# Patient Record
Sex: FEMALE | Race: OTHER
[De-identification: names, ages, dates, MRNs, and addresses within clinical notes are randomized per-mention and may not be internally consistent; named-entity substitution may affect disease eponyms.]

---

## 2017-04-07 ENCOUNTER — HOSPITAL ENCOUNTER (EMERGENCY)
Dept: HOSPITAL 74 - FER | Age: 62
Discharge: LEFT BEFORE DISCHARGE | End: 2017-04-07
Payer: COMMERCIAL

## 2017-04-07 VITALS — BODY MASS INDEX: 2.4 KG/M2

## 2017-04-07 VITALS — SYSTOLIC BLOOD PRESSURE: 134 MMHG | HEART RATE: 80 BPM | DIASTOLIC BLOOD PRESSURE: 80 MMHG | TEMPERATURE: 97.9 F

## 2017-04-07 DIAGNOSIS — E78.5: ICD-10-CM

## 2017-04-07 DIAGNOSIS — R07.9: Primary | ICD-10-CM

## 2017-04-07 DIAGNOSIS — J45.909: ICD-10-CM

## 2017-04-07 LAB
ALBUMIN SERPL-MCNC: 3.9 G/DL (ref 3.5–5)
ALP SERPL-CCNC: 64 U/L (ref 32–92)
ALT SERPL-CCNC: 20 U/L (ref 10–40)
ANION GAP SERPL CALC-SCNC: 5 MMOL/L (ref 8–16)
AST SERPL-CCNC: 20 U/L (ref 10–42)
BASOPHILS # BLD: 1 % (ref 0–2)
BILIRUB SERPL-MCNC: < 0.3 MG/DL (ref 0.2–1)
CALCIUM SERPL-MCNC: 9.1 MG/DL (ref 8.4–10.2)
CK SERPL-CCNC: 80 IU/L (ref 26–140)
CO2 SERPL-SCNC: 24 MMOL/L (ref 22–28)
COCKROFT - GAULT: 6.96
CREAT SERPL-MCNC: 0.8 MG/DL (ref 0.6–1.3)
DEPRECATED RDW RBC AUTO: 13.1 % (ref 11.6–15.6)
EOSINOPHIL # BLD: 4 % (ref 0–4.5)
GLUCOSE SERPL-MCNC: 91 MG/DL (ref 74–106)
INR BLD: 1 (ref 0.82–1.09)
MCH RBC QN AUTO: 28 PG (ref 25.7–33.7)
MCHC RBC AUTO-ENTMCNC: 33.7 G/DL (ref 32–36)
MCV RBC: 83 FL (ref 80–96)
NEUTROPHILS # BLD: 51 % (ref 42.8–82.8)
PMV BLD: 9.7 FL (ref 7.5–11.1)
PROT SERPL-MCNC: 7.1 G/DL (ref 6.4–8.3)
PT PNL PPP: 11.2 SEC (ref 10.2–13)
TROPONIN I SERPL-MCNC: < 0.03 NG/ML (ref 0.03–0.5)
WBC # BLD AUTO: 7.9 K/MM3 (ref 4–10)

## 2017-04-07 PROCEDURE — 3E033GC INTRODUCTION OF OTHER THERAPEUTIC SUBSTANCE INTO PERIPHERAL VEIN, PERCUTANEOUS APPROACH: ICD-10-PCS

## 2017-04-07 NOTE — PDOC
History of Present Illness





- General


Chief Complaint: Chest Pain


Stated Complaint: CHEST PRESSURE


Time Seen by Provider: 04/07/17 12:22


History Source: Patient


Exam Limitations: No Limitations





- History of Present Illness


Initial Comments: 


04/07/17 12:23


This pt is a 60 yo F with a past medical history of pre Diabetes, HLD, Asthma 

presenting to the ER with a complaint of chest pressure.  


The patient reports that the symptoms began yesterday.  Yeserday had chest 

pressure lasting 15 - 20 minutes.  


Today this recurred and is ongoing, occurred while doing nothing in particular. 


PT described chest symptoms as pressure, rates it 4/10, no radiation to arm/jaw 

or back


No associated diaphoresis or nausea


No exertional symptoms


Pt denies chest wall trauma or bruising.


Pt denies lower extremity edema.


Pt denies cough or shortness of breath


Pt denies nausea, vomiting, or diarrhea. 


Pt denies lightheadedness, palpitations, or syncope


Pt denies radiation of pain, aggravation of pain or alleviation of pain.


Pt denies allergies, and significant past hospitalizations.


Pt denies alcohol use, tobacco use, or recreational drug use. 


Pt denies past history of cardiac disease. 


Stress test ?10 years ago (Reportedly negative)


Pt denies recent travel. 








PMH: pre diabetes, HLD


PSH: denies


Meds: Singulair, Atorvastatin, Metformin


ALL: NKDA


Social: denies tobacco use








GENERAL/CONSTITUTIONAL: No: fever, chills, weakness, loss of appetite.


HEAD, EYES, EARS, NOSE AND THROAT: No: change in vision, ear pain, discharge, 

sore throat, throat swelling.


CARDIOVASCULAR: (+) chest pain No: lightheadedness, palpitations, syncope


RESPIRATORY: No: cough, shortness of breath, wheezing, hemoptysis, stridor.


GASTROINTESTINAL: No: nausea, vomiting, diarrhea, abdominal cramping, rectal 

bleeding, constipation. 


GENITOURINARY: No: dysuria, hematuria, frequency, urgency, flank pain.


MUSCULOSKELETAL: No: back pain, neck pain, joint pain, muscle swelling or pain


SKIN AND BREASTS: No: lesions, pallor, rash or easy bruising.


NEUROLOGIC: No: headache, vertigo, paresthesias, weakness 


ENDOCRINE: No: unexplained weight gain or loss


HEMATOLOGIC/LYMPHATIC: No: anemia, easy bleeding, swelling nodes.


 


GENERAL: The patient is in no acute distress.


HEAD: Normal with no signs of trauma.


EYES: PERRLA, EOMI, sclera anicteric, conjunctiva clear.


ENT: Ears normal, nares patent, oropharynx clear without exudates.  Moist 

mucous membranes.


NECK: Normal range of motion, supple without lymphadenopathy, JVD, or masses.


LUNGS: Breath sounds equal, clear to auscultation bilaterally.  No wheezes, and 

no crackles.


HEART: Regular rate and rhythm, normal S1 and S2 without murmur, rub or gallop.


ABDOMEN: Soft, nontender, normoactive bowel sounds.  No guarding, no rebound.  

No masses palpable.


EXTREMITIES: Normal range of motion, no edema.  No clubbing or cyanosis. No 

erythema, or tenderness.


NEUROLOGICAL: Cranial nerves II through XII grossly intact.  Normal speech.  No 

focal neurological deficits. 


MUSCULOSKELETAL:  Back non-tender to palpation, no CVA tenderness


SKIN: Warm, Dry, normal turgor, no rashes or lesions noted. 





04/07/17 12:24








04/07/17 12:39








Past History





- Past Medical History


Allergies/Adverse Reactions: 


 Allergies











Allergy/AdvReac Type Severity Reaction Status Date / Time


 


No Known Allergies Allergy   Verified 04/07/17 12:18











Home Medications: 


Ambulatory Orders





Montelukast Na [Singulair] 10 mg PO DAILY 03/26/12 


Atorvastatin Ca [Lipitor] 0 mg PO HS 04/07/17 


Metformin HCl [Metformin HCl ER] 500 mg PO BID 04/07/17 








Asthma: Yes


Diabetes: Yes (PRE)


Hypercholesterolemia: Yes





- Psycho/Social/Smoking Cessation Hx


Anxiety: No


Suicidal Ideation: No


Smoking Status: No


Smoking History: Never smoked


Number of Cigarettes Smoked Daily: 0


Hx Alcohol Use: No


Drug/Substance Use Hx: No


Substance Use Type: None





*Physical Exam





- Vital Signs


 Last Vital Signs











Temp Pulse Resp BP Pulse Ox


 


 97.9 F   80   16   134/80   100 


 


 04/07/17 12:17  04/07/17 12:17  04/07/17 12:17  04/07/17 12:17  04/07/17 12:17














**Heart Score/ECG Review





- History


History: Moderately suspicious





- Electrocardiogram


EKG: Normal





- Age


Age: 45-65





- Risk Factors


Risk Factors Heart Score: Yes Hx Hypercholesterolemia, Yes Hx Diabetes, Yes 

Smoking History


Based on the list above the patient has:: >/=3 risk factors or Hx 

atherosclerotic disease





- Troponin


Troponin: </= normal limit





- Score


Heart Score - Total: 4


  ** #1


ECG reviewed & interpreted by me at: 12:43


General ECG Interpretation: Sinus Rhythm, Normal Rate, Normal Intervals, No 

acute ischemic changes





ED Treatment Course





- LABORATORY


CBC & Chemistry Diagram: 


 04/07/17 12:10





 04/07/17 12:10





- ADDITIONAL ORDERS


Additional order review: 


 Laboratory  Results











  04/07/17 04/07/17 04/07/17





  12:50 12:10 12:10


 


INR    1.00


 


Sodium   134 L 


 


Potassium   4.1 


 


Chloride   105 


 


Carbon Dioxide   24 


 


Anion Gap   5 L 


 


BUN   16 


 


Creatinine   0.8 


 


Creat Clearance w eGFR   > 60 


 


Random Glucose   91 


 


Calcium   9.1 


 


Total Bilirubin   < 0.3  D 


 


AST   20 


 


ALT   20 


 


Alkaline Phosphatase   64  D 


 


Creatine Kinase  80  Cancelled 


 


Troponin I  < 0.03 L  Cancelled 


 


Total Protein   7.1 


 


Albumin   3.9 








 











  04/07/17





  12:10


 


RBC  4.88


 


MCV  83.0


 


MCHC  33.7


 


RDW  13.1


 


MPV  9.7


 


Neutrophils %  51.0


 


Lymphocytes %  27.0


 


Monocytes %  9.0


 


Eosinophils %  4.0


 


Basophils %  1.0














- RADIOLOGY


Radiology Studies Ordered: 














 Category Date Time Status


 


 CHEST X-RAY PORTABLE* [RAD] Stat Radiology  04/07/17 12:27 Completed














- Medications


Given in the ED: 


ED Medications














Discontinued Medications














Generic Name Dose Route Start Last Admin





  Trade Name Freq  PRN Reason Stop Dose Admin


 


Aspirin  162 mg 04/07/17 12:27 04/07/17 12:52





  Asa -  PO 04/07/17 12:28  162 mg





  ONCE ONE   Administration


 


Famotidine/Sodium Chloride  50 mls @ 100 mls/hr 04/07/17 12:45 04/07/17 12:52





  Pepcid 20 Mg Premixed Ivpb -  IVPB 04/07/17 13:14  100 mls/hr





  ONCE ONE   Administration














Medical Decision Making





- Medical Decision Making


04/07/17 12:44


Pt presents to the ER with a complaint of chest pressure


(+) risk factors


Differential includes cardiac ischemia, pe, asthma exacerbation, pneumonia, 

pneumothorax, pleural effusion, costochondritis, pericarditis, GERD.


Will do labs


Will do xray


Will re assess





04/07/17 14:21


 Laboratory Tests











  04/07/17 04/07/17 04/07/17





  12:10 12:10 12:10


 


WBC  7.9  


 


Hgb  13.6  


 


Hct  40.5  


 


Plt Count  No Result Required.  


 


INR   1.00 


 


Sodium    134 L


 


Potassium    4.1


 


Chloride    105


 


Carbon Dioxide    24


 


BUN    16


 


Creatinine    0.8


 


Random Glucose    91


 


Creatine Kinase    Cancelled


 


Troponin I    Cancelled














  04/07/17





  12:50


 


WBC 


 


Hgb 


 


Hct 


 


Plt Count 


 


INR 


 


Sodium 


 


Potassium 


 


Chloride 


 


Carbon Dioxide 


 


BUN 


 


Creatinine 


 


Random Glucose 


 


Creatine Kinase  80


 


Troponin I  < 0.03 L














04/07/17 14:21


Xray demonstrates bilateral plate like atalectasis


Pt feels well now


Pt does not want to stay overnight


We had a long conversation about the risk of chest pain in women being the 

result of cardiac ischemia and presenting with non specific symptoms


Pt does not want to stay in the hospital


She would rather follow up with her PMD and then cardiology





Pt understands that she leaves the ER today against my medical advise





Note:  The patient insists on leaving the emergency dept and is signing out 

against medical advice.  





The patient understands the risks and complications that may result from the 

refusal of medical care and admission which includes death and permanent 

disability.  The patient has the mental capacity of understanding the risks of 

refusing care and is capable of making an informed decision.  





The patient was instructed to return to the emergency department should she 

change her mind regarding medical care or should her condition worsen or change 

in any way.





The patient signed the Against Medical Advice form.





*DC/Admit/Observation/Transfer


Diagnosis at time of Disposition: 


Chest pain


Qualifiers:


 Chest pain type: unspecified Qualified Code(s): R07.9 - Chest pain, unspecified





- Discharge Dispostion


Disposition: AGAINST MEDICAL ADVICE


Condition at time of disposition: Fair


Admit: No





- Patient Instructions


Printed Discharge Instructions:  DI for Atypical Chest Pain, DI for Chest Pain


Additional Instructions: 


Ms Brown





Thank you so much for coming in to the ER today


As we discussed, I am concerned about cardiac ischemia


Please do not hesitate to come to the ER IMMEDIATELY by EMS if you have any new 

symptoms, any other concerns or complaints


Please follow up with your primary care physician as we discussed in 2 days

## 2017-04-10 NOTE — EKG
Test Reason : 

Blood Pressure : ***/*** mmHG

Vent. Rate : 076 BPM     Atrial Rate : 076 BPM

   P-R Int : 142 ms          QRS Dur : 072 ms

    QT Int : 382 ms       P-R-T Axes : 061 054 054 degrees

   QTc Int : 429 ms

 

SINUS RHYTHM

RSR' OR QR PATTERN IN V1 SUGGESTS RIGHT VENTRICULAR CONDUCTION DELAY

WHEN COMPARED WITH ECG OF 23-SEP-2006 08:40,

RSR' PATTERN IN V1 is now present

 

Confirmed by BOBBY LEVY, GEORGE ([id number]) on 4/10/2017 9:12:54 AM

 

Referred By:  KIMBERLY           Confirmed By:GEORGE ROSALES MD

## 2017-05-12 ENCOUNTER — HOSPITAL ENCOUNTER (OUTPATIENT)
Dept: HOSPITAL 74 - FASU | Age: 62
Discharge: HOME | End: 2017-05-12
Attending: INTERNAL MEDICINE
Payer: COMMERCIAL

## 2017-05-12 VITALS — SYSTOLIC BLOOD PRESSURE: 123 MMHG | DIASTOLIC BLOOD PRESSURE: 65 MMHG | HEART RATE: 71 BPM

## 2017-05-12 VITALS — TEMPERATURE: 97.6 F

## 2017-05-12 VITALS — BODY MASS INDEX: 27.1 KG/M2

## 2017-05-12 DIAGNOSIS — K64.4: ICD-10-CM

## 2017-05-12 DIAGNOSIS — Z80.0: ICD-10-CM

## 2017-05-12 DIAGNOSIS — K63.5: ICD-10-CM

## 2017-05-12 DIAGNOSIS — K44.9: ICD-10-CM

## 2017-05-12 DIAGNOSIS — K29.50: ICD-10-CM

## 2017-05-12 DIAGNOSIS — Z12.11: Primary | ICD-10-CM

## 2017-05-12 PROCEDURE — 0DB68ZX EXCISION OF STOMACH, VIA NATURAL OR ARTIFICIAL OPENING ENDOSCOPIC, DIAGNOSTIC: ICD-10-PCS | Performed by: INTERNAL MEDICINE

## 2017-05-12 PROCEDURE — 0DBK8ZX EXCISION OF ASCENDING COLON, VIA NATURAL OR ARTIFICIAL OPENING ENDOSCOPIC, DIAGNOSTIC: ICD-10-PCS | Performed by: INTERNAL MEDICINE

## 2017-05-12 PROCEDURE — 0DB58ZX EXCISION OF ESOPHAGUS, VIA NATURAL OR ARTIFICIAL OPENING ENDOSCOPIC, DIAGNOSTIC: ICD-10-PCS | Performed by: INTERNAL MEDICINE

## 2017-05-12 PROCEDURE — 0DB98ZX EXCISION OF DUODENUM, VIA NATURAL OR ARTIFICIAL OPENING ENDOSCOPIC, DIAGNOSTIC: ICD-10-PCS | Performed by: INTERNAL MEDICINE

## 2017-05-15 NOTE — PATH
Surgical Pathology Report



Patient Name:  HUNTER STEWART

Accession #:  

[facility]. Rec. #:  [id number]                                                        

   /Age/Gender:  1955 (Age: 61) / F

Account:  [id number]                                                          

             Location: [facility] AMBULATORY 

Taken:  2017

Received:  2017

Reported:  5/15/2017

Physicians:  Aleisha Main M.D.

  



Specimen(s) Received

A: BX ASCENDING COLON 

B: BX DESCENDING COLON 

C: BX ANTRUM 

D: BX GASTRIC BODY 

E: BX ESOPHAGUS 





Clinical History

Family history of malignant neoplasm of gastrointestinal tract

Polyp, rule out celiac disease, rule out H. Pylori, gastritis, GERD







Final Diagnosis

A. ASCENDING COLON, BIOPSY:  

HYPERPLASTIC POLYP.



B. DUODENUM, BIOPSY:  

DUODENAL MUCOSA WITH NO PATHOLOGIC FINDINGS.



Note: Features suggestive of celiac disease are not identified in this biopsy.



C. ANTRUM, BIOPSY:  

MODERATE CHRONIC GASTRITIS WITH INTESTINAL METAPLASIA.

IMMUNOSTAIN IS NEGATIVE FOR H. PYLORI ORGANISMS.



D. GASTRIC BODY, BIOPSY:  

MILD CHRONIC GASTRITIS.

IMMUNOSTAIN IS NEGATIVE FOR H. PYLORI ORGANISMS.



E. ESOPHAGUS, BIOPSY:  

ESOPHAGOGASTRIC (SQUAMOCOLUMNAR) MUCOSA SHOWING FEATURES OF REFLUX ESOPHAGITIS.

NO COLUMNAR EPITHELIUM/INTESTINAL METAPLASIA IS IDENTIFIED.





***Electronically Signed***

Caitlin Álvarez M.D.





Gross Description

A.  Received in formalin, labeled "ascending" is a tan, irregular portion of

soft tissue measuring 0.6 cm. in greatest dimension. The specimen is submitted

in toto in one cassette.



B.  Received in formalin, labeled "duodenum" are 2 tan, irregular portions of

soft tissue measuring 0.2 and 0.6 cm. in greatest dimension. The specimens are

submitted in toto in one cassette.



C.  Received in formalin, labeled "antrum" is a tan, irregular portion of soft

tissue measuring 0.3 cm. in greatest dimension. The specimen is submitted in

toto in one cassette.



D.  Received in formalin, labeled "body" are 2 tan, irregular portions of soft

tissue measuring 0.1 and 0.3 cm. in greatest dimension. The specimens are

submitted in toto in one cassette.



E.  Received in formalin, labeled "esophagus" is a tan, irregular portion of

soft tissue measuring 0.4 cm. in greatest dimension. The specimen is submitted

in toto in one cassette.

/2017

## 2018-02-19 ENCOUNTER — TRANSCRIPTION ENCOUNTER (OUTPATIENT)
Age: 63
End: 2018-02-19

## 2018-09-17 ENCOUNTER — TRANSCRIPTION ENCOUNTER (OUTPATIENT)
Age: 63
End: 2018-09-17

## 2018-09-30 ENCOUNTER — HOSPITAL ENCOUNTER (EMERGENCY)
Dept: HOSPITAL 74 - FER | Age: 63
Discharge: HOME | End: 2018-09-30
Payer: COMMERCIAL

## 2018-09-30 VITALS — SYSTOLIC BLOOD PRESSURE: 127 MMHG | HEART RATE: 87 BPM | DIASTOLIC BLOOD PRESSURE: 77 MMHG | TEMPERATURE: 98.3 F

## 2018-09-30 VITALS — BODY MASS INDEX: 26.4 KG/M2

## 2018-09-30 DIAGNOSIS — E78.00: ICD-10-CM

## 2018-09-30 DIAGNOSIS — Z87.891: ICD-10-CM

## 2018-09-30 DIAGNOSIS — K21.9: ICD-10-CM

## 2018-09-30 DIAGNOSIS — R10.31: Primary | ICD-10-CM

## 2018-09-30 DIAGNOSIS — E11.9: ICD-10-CM

## 2018-09-30 LAB
ALBUMIN SERPL-MCNC: 3.5 G/DL (ref 3.4–5)
ALP SERPL-CCNC: 86 U/L (ref 45–117)
ALT SERPL-CCNC: 26 U/L (ref 13–61)
ANION GAP SERPL CALC-SCNC: 7 MMOL/L (ref 8–16)
AST SERPL-CCNC: 19 U/L (ref 15–37)
BASOPHILS # BLD: 1.8 % (ref 0–2)
BILIRUB SERPL-MCNC: 0.3 MG/DL (ref 0.2–1)
BUN SERPL-MCNC: 12 MG/DL (ref 7–18)
CALCIUM SERPL-MCNC: 8.8 MG/DL (ref 8.5–10.1)
CHLORIDE SERPL-SCNC: 106 MMOL/L (ref 98–107)
CO2 SERPL-SCNC: 26 MMOL/L (ref 21–32)
CREAT SERPL-MCNC: 0.9 MG/DL (ref 0.55–1.3)
DEPRECATED RDW RBC AUTO: 14.6 % (ref 11.6–15.6)
EOSINOPHIL # BLD: 4.3 % (ref 0–4.5)
GLUCOSE SERPL-MCNC: 96 MG/DL (ref 74–106)
HCT VFR BLD CALC: 39.7 % (ref 32.4–45.2)
HGB BLD-MCNC: 13.2 GM/DL (ref 10.7–15.3)
LIPASE SERPL-CCNC: 116 U/L (ref 73–393)
LYMPHOCYTES # BLD: 36.9 % (ref 8–40)
MCH RBC QN AUTO: 27.5 PG (ref 25.7–33.7)
MCHC RBC AUTO-ENTMCNC: 33.2 G/DL (ref 32–36)
MCV RBC: 82.9 FL (ref 80–96)
MONOCYTES # BLD AUTO: 7.7 % (ref 3.8–10.2)
NEUTROPHILS # BLD: 49.3 % (ref 42.8–82.8)
PH UR: 8.5 [PH] (ref 4.5–8)
PLATELET # BLD AUTO: (no result) K/MM3 (ref 134–434)
PLATELET BLD QL SMEAR: (no result)
PMV BLD: (no result) FL (ref 7.5–11.1)
POTASSIUM SERPLBLD-SCNC: 4.4 MMOL/L (ref 3.5–5.1)
PROT SERPL-MCNC: 7.1 G/DL (ref 6.4–8.2)
RBC # BLD AUTO: 4.79 M/MM3 (ref 3.6–5.2)
SODIUM SERPL-SCNC: 139 MMOL/L (ref 136–145)
SP GR UR: 1.01 (ref 1–1.02)
UROBILINOGEN UR STRIP-MCNC: 0.2 MG/DL (ref 0.2–1)
WBC # BLD AUTO: 7.5 K/MM3 (ref 4–10)

## 2018-09-30 NOTE — PDOC
*Physical Exam





- Vital Signs


 Last Vital Signs











Temp Pulse Resp BP Pulse Ox


 


 98.3 F   87   16   127/77   99 


 


 09/30/18 04:40  09/30/18 04:40  09/30/18 04:40  09/30/18 04:40  09/30/18 04:40











09/30/18 07:31


63 y/o female initially seen by Dr. Eli, present with several weeks of 

abdominal  pain on/off. Patient states this pain feels a little different. No N/

V/d/C. No  fevr or chills. Denies blood in stool. No dysuria. Has mild back 

pain. No fall or trauma. Patient has appointment with GI coming up. 





- Physical Exam


General Appearance: Yes: Nourished, Appropriately Dressed


HEENT: positive: EOMI, ZAHRAA, Normal ENT Inspection


Neck: positive: Trachea midline, Normal Thyroid, Supple.  negative: Rigid


Respiratory/Chest: positive: Lungs Clear, Normal Breath Sounds


Cardiovascular: positive: Regular Rhythm, Regular Rate, S1, S2


Vascular Pulses: Femoral (R): 4+, Femoral (L): 4+, Carotid (R): 4+, Carotid (L)

: 4+, Dorsalis-Pedis (R): 4+, Doralis-Pedis (L): 4+


Gastrointestinal/Abdominal: positive: Normal Bowel Sounds, Flat, Soft.  negative

: Tender (mild tenderness to RLQ and RUQ, no tendernss to LLQ or LUQ, +BS), 

Organomegaly, Pulsatile Mass


Lymphatic: negative: Adenopathy, Tenderness, Other


Musculoskeletal: positive: Normal Inspection.  negative: CVA Tenderness


Extremity: positive: Normal Capillary Refill, Normal Inspection, Normal Range 

of Motion


Integumentary: positive: Normal Color, Dry, Warm


Neurologic: positive: CNs II-XII NML intact, Fully Oriented, Alert, Normal Mood/

Affect, Normal Response, Motor Strength 5/5





ED Treatment Course





- LABORATORY


CBC & Chemistry Diagram: 


 09/30/18 04:55





 09/30/18 04:55





- ADDITIONAL ORDERS


Additional order review: 


 Laboratory  Results











  09/30/18





  04:55


 


Sodium  139


 


Potassium  4.4


 


Chloride  106


 


Carbon Dioxide  26


 


Anion Gap  7 L


 


BUN  12


 


Creatinine  0.9


 


Creat Clearance w eGFR  > 60


 


Random Glucose  96


 


Calcium  8.8


 


Total Bilirubin  0.3


 


AST  19


 


ALT  26


 


Alkaline Phosphatase  86


 


Total Protein  7.1


 


Albumin  3.5


 


Lipase  116








 











  09/30/18





  04:55


 


RBC  4.79


 


MCV  82.9


 


MCHC  33.2


 


RDW  14.6


 


Neutrophils %  49.3


 


Lymphocytes %  36.9


 


Monocytes %  7.7


 


Eosinophils %  4.3


 


Basophils %  1.8














Progress Note





- Progress Note


Progress Note: 





Pt is doing well. Labs negative.


CT abdomen/pelvis negative.


Will discharge home with follow up with GI


Patient is in agreement with plan.





*DC/Admit/Observation/Transfer


Diagnosis at time of Disposition: 


Abdominal pain


Qualifiers:


 Abdominal location: right lower quadrant Qualified Code(s): R10.31 - Right 

lower quadrant pain








- Discharge Dispostion


Disposition: HOME


Condition at time of disposition: Improved


Decision to Admit order: No





- Referrals





- Patient Instructions


Printed Discharge Instructions:  DI for Abdominal Pain-Adult


Additional Instructions: 


Fluids, rest, Tylenol


Follow up with your gastroenterologist


If worsen return to ER





- Post Discharge Activity

## 2018-09-30 NOTE — PDOC
History of Present Illness





- General


Chief Complaint: Constipation


Stated Complaint: Stomach Pains x1 day


Time Seen by Provider: 09/30/18 04:42


History Source: Patient


Exam Limitations: No Limitations





- History of Present Illness


Initial Comments: 





09/30/18 04:49


This is a 62-year-old female who comes in complaining of right lower quadrant 

abdominal pain. Patient states she has had pain since yesterday afternoon. 

Patient denies any fever, chills, nausea, anorexia or any other complaints.  

Patient has a history for hypertension, hyperlipidemia and diabetes.. Patient 

denies history of surgery on her abdomen past. 





PAST MEDICAL HISTORY:  no significant history





PAST SURGICAL HISTORY:  no significant history





FAMILY HISTORY:  no pertinant history





SOCIAL HISTORY:  Pt lives with  family and is employed.





MEDICATIONS:  reviewed





ALLERGIES:  As per nursing notes





ROS


General:  No fevers or chills, no weakness, no weight loss 


HEENT: No change in vision.  No sore throat,. No ear pain


CardioVascular:  No chest pain or shortness of breath


Respiratory:No cough, or wheezing. 


Gastrointestinal:  no nausea, vomiting, diarrhea or constipation,  No rectal 

bleeding, + abdominal pain


Genitourinary:  No dysuria, hematuria, or frequency


Musculoskeletal: . No joint pain or swelling


Neurologic: No headache, vertigo, dizziness or loss of consciousness


Psychiatric: nor depression 


Skin: No rashes or easy bruising


Endocrine: no increased thirst or abnormal weight change


Allergic: no skin or latex allergy


All other systems reviewed and normal





Exam:





General: Well-nourished well-developed individual, no acute distress


HEENT: Throat: Normal, tonsils normal, no erythema or exudate


               Neck: Supple, no meningeal signs, no lymphadenopathy


Eyes::Pupils equal reactive and round, extraocular motion intact


Chest: Nontender to palpation 


Cardiac: S1-S2 normal, regular rate and rhythm, no murmurs rubs or gallops


Respiratory: Lungs clear to auscultation bilateral


Abdomen: Soft, nondistended, normal bowel sounds, there is  tenderness on 

palpation right side of her abdomen more pronounced in the right lower 

quadrant. There is no guarding or rebound


Extremities: Warm, dry, no cyanosis, clubbing, or edema


Skin: No rashes


Neuro: Alert and oriented x3, CN II - XII intact, nonfocal exam with normal 

strength, normal sensation, normal reflexes, normal gait, 


Psych: Normal mood and affect








Past History





- Past Medical History


Allergies/Adverse Reactions: 


 Allergies











Allergy/AdvReac Type Severity Reaction Status Date / Time


 


No Known Allergies Allergy   Verified 05/12/17 05:44











Home Medications: 


Ambulatory Orders





Montelukast Na [Singulair -] 10 mg PO DAILY 03/26/12 


Atorvastatin Ca [Lipitor] 20 mg PO HS 04/07/17 


Metformin HCl [Metformin HCl ER] 500 mg PO BID 04/07/17 


Albuterol Sulfate [Proair Respiclick] 90 mcg IH DAILY PRN 05/01/17 


Cholecalciferol (Vitamin D3) [Vitamin D3] 1,000 unit PO DAILY 09/30/18 








Anemia: No


Asthma: Yes


Cancer: No


Cardiac Disorders: No


CVA: No


COPD: No


CHF: No


Dementia: No


Diabetes: Yes


GI Disorders: Yes (GERD)


 Disorders: No


HTN: No


Hypercholesterolemia: Yes


Liver Disease: No


Seizures: No


Thyroid Disease: No





- Surgical History


Abdominal Surgery: No


Appendectomy: No


Cardiac Surgery: No


Cholecystectomy: No


Lung Surgery: No


Neurologic Surgery: No


Orthopedic Surgery: No





- Suicide/Smoking/Psychosocial Hx


Smoking Status: No


Smoking History: Former smoker


Have you smoked in the past 12 months: No


Number of Cigarettes Smoked Daily: 0


If you are a former smoker, when did you quit?: 1999


Hx Alcohol Use: Yes (RARE)


Drug/Substance Use Hx: No


Substance Use Type: Alcohol


Hx Substance Use Treatment: No





ED Treatment Course





- LABORATORY


CBC & Chemistry Diagram: 


 09/30/18 04:55





 09/30/18 04:55





*DC/Admit/Observation/Transfer


Diagnosis at time of Disposition: 


Abdominal pain


Qualifiers:


 Abdominal location: right lower quadrant Qualified Code(s): R10.31 - Right 

lower quadrant pain








- Discharge Dispostion


Disposition: HOME


Condition at time of disposition: Improved





- Referrals





- Patient Instructions


Printed Discharge Instructions:  DI for Abdominal Pain-Adult


Additional Instructions: 


Fluids, rest, Tylenol


Follow up with your gastroenterologist


If worsen return to ER





- Post Discharge Activity

## 2018-12-13 ENCOUNTER — TRANSCRIPTION ENCOUNTER (OUTPATIENT)
Age: 63
End: 2018-12-13

## 2019-02-06 ENCOUNTER — TRANSCRIPTION ENCOUNTER (OUTPATIENT)
Age: 64
End: 2019-02-06

## 2019-10-26 ENCOUNTER — TRANSCRIPTION ENCOUNTER (OUTPATIENT)
Age: 64
End: 2019-10-26

## 2019-12-06 ENCOUNTER — RECORD ABSTRACTING (OUTPATIENT)
Age: 64
End: 2019-12-06

## 2019-12-06 DIAGNOSIS — Z83.3 FAMILY HISTORY OF DIABETES MELLITUS: ICD-10-CM

## 2019-12-06 DIAGNOSIS — Z81.8 FAMILY HISTORY OF OTHER MENTAL AND BEHAVIORAL DISORDERS: ICD-10-CM

## 2019-12-06 DIAGNOSIS — Z87.59 PERSONAL HISTORY OF OTHER COMPLICATIONS OF PREGNANCY, CHILDBIRTH AND THE PUERPERIUM: ICD-10-CM

## 2019-12-06 DIAGNOSIS — Z86.39 PERSONAL HISTORY OF OTHER ENDOCRINE, NUTRITIONAL AND METABOLIC DISEASE: ICD-10-CM

## 2019-12-06 DIAGNOSIS — Z88.9 ALLERGY STATUS TO UNSPECIFIED DRUGS, MEDICAMENTS AND BIOLOGICAL SUBSTANCES: ICD-10-CM

## 2019-12-06 DIAGNOSIS — Z87.891 PERSONAL HISTORY OF NICOTINE DEPENDENCE: ICD-10-CM

## 2019-12-06 DIAGNOSIS — Z87.09 PERSONAL HISTORY OF OTHER DISEASES OF THE RESPIRATORY SYSTEM: ICD-10-CM

## 2019-12-06 LAB — CYTOLOGY CVX/VAG DOC THIN PREP: NORMAL

## 2019-12-06 RX ORDER — ALBUTEROL SULFATE 90 UG/1
108 (90 BASE) AEROSOL, METERED RESPIRATORY (INHALATION)
Refills: 0 | Status: ACTIVE | COMMUNITY

## 2019-12-06 RX ORDER — FLUTICASONE PROPIONATE AND SALMETEROL 50; 100 UG/1; UG/1
100-50 POWDER RESPIRATORY (INHALATION)
Refills: 0 | Status: ACTIVE | COMMUNITY

## 2019-12-06 RX ORDER — MONTELUKAST SODIUM 10 MG/1
10 TABLET, FILM COATED ORAL
Refills: 0 | Status: ACTIVE | COMMUNITY

## 2020-01-24 ENCOUNTER — APPOINTMENT (OUTPATIENT)
Dept: OBGYN | Facility: CLINIC | Age: 65
End: 2020-01-24
Payer: COMMERCIAL

## 2020-01-24 VITALS
SYSTOLIC BLOOD PRESSURE: 118 MMHG | DIASTOLIC BLOOD PRESSURE: 72 MMHG | WEIGHT: 145 LBS | HEIGHT: 61 IN | BODY MASS INDEX: 27.38 KG/M2

## 2020-01-24 DIAGNOSIS — Z12.31 ENCOUNTER FOR SCREENING MAMMOGRAM FOR MALIGNANT NEOPLASM OF BREAST: ICD-10-CM

## 2020-01-24 DIAGNOSIS — R92.2 INCONCLUSIVE MAMMOGRAM: ICD-10-CM

## 2020-01-24 PROCEDURE — 99396 PREV VISIT EST AGE 40-64: CPT

## 2020-01-29 NOTE — HISTORY OF PRESENT ILLNESS
[1 Year Ago] : 1 year ago [Healthy Diet] : a healthy diet [Good] : being in good health [Post-Menopause, No Sxs] : post-menopausal, currently without symptoms [Regular Exercise] : regular exercise [Monogamous] : is monogamous [Sexually Active] : is sexually active

## 2020-04-26 ENCOUNTER — MESSAGE (OUTPATIENT)
Age: 65
End: 2020-04-26

## 2020-05-18 DIAGNOSIS — N39.0 URINARY TRACT INFECTION, SITE NOT SPECIFIED: ICD-10-CM

## 2020-06-03 LAB
CYTOLOGY CVX/VAG DOC THIN PREP: NORMAL
HPV HIGH+LOW RISK DNA PNL CVX: NOT DETECTED

## 2020-12-23 PROBLEM — N39.0 ACUTE UTI: Status: RESOLVED | Noted: 2020-05-18 | Resolved: 2020-12-23

## 2021-01-17 ENCOUNTER — TRANSCRIPTION ENCOUNTER (OUTPATIENT)
Age: 66
End: 2021-01-17

## 2021-01-18 ENCOUNTER — TRANSCRIPTION ENCOUNTER (OUTPATIENT)
Age: 66
End: 2021-01-18

## 2021-08-27 ENCOUNTER — APPOINTMENT (OUTPATIENT)
Dept: OBGYN | Facility: CLINIC | Age: 66
End: 2021-08-27
Payer: COMMERCIAL

## 2021-08-27 ENCOUNTER — NON-APPOINTMENT (OUTPATIENT)
Age: 66
End: 2021-08-27

## 2021-08-27 VITALS
SYSTOLIC BLOOD PRESSURE: 122 MMHG | WEIGHT: 134 LBS | DIASTOLIC BLOOD PRESSURE: 70 MMHG | BODY MASS INDEX: 25.3 KG/M2 | HEIGHT: 61 IN

## 2021-08-27 DIAGNOSIS — Z13.820 ENCOUNTER FOR SCREENING FOR OSTEOPOROSIS: ICD-10-CM

## 2021-08-27 DIAGNOSIS — Z11.51 ENCOUNTER FOR SCREENING FOR HUMAN PAPILLOMAVIRUS (HPV): ICD-10-CM

## 2021-08-27 PROCEDURE — 99396 PREV VISIT EST AGE 40-64: CPT

## 2021-08-27 RX ORDER — ATORVASTATIN CALCIUM 80 MG/1
TABLET, FILM COATED ORAL
Refills: 0 | Status: ACTIVE | COMMUNITY

## 2021-08-27 RX ORDER — NITROFURANTOIN (MONOHYDRATE/MACROCRYSTALS) 25; 75 MG/1; MG/1
100 CAPSULE ORAL
Qty: 14 | Refills: 0 | Status: DISCONTINUED | COMMUNITY
Start: 2020-05-18 | End: 2021-08-27

## 2021-08-27 RX ORDER — METFORMIN HYDROCHLORIDE 625 MG/1
TABLET ORAL
Refills: 0 | Status: ACTIVE | COMMUNITY

## 2021-08-27 RX ORDER — SULFAMETHOXAZOLE AND TRIMETHOPRIM 800; 160 MG/1; MG/1
800-160 TABLET ORAL TWICE DAILY
Refills: 0 | Status: DISCONTINUED | COMMUNITY
End: 2021-08-27

## 2021-09-01 LAB
CYTOLOGY CVX/VAG DOC THIN PREP: NORMAL
HPV HIGH+LOW RISK DNA PNL CVX: NOT DETECTED

## 2023-01-13 ENCOUNTER — APPOINTMENT (OUTPATIENT)
Dept: OBGYN | Facility: CLINIC | Age: 68
End: 2023-01-13
Payer: COMMERCIAL

## 2023-01-13 DIAGNOSIS — K64.9 UNSPECIFIED HEMORRHOIDS: ICD-10-CM

## 2023-01-13 PROCEDURE — 99397 PER PM REEVAL EST PAT 65+ YR: CPT

## 2023-01-13 NOTE — PLAN
[FreeTextEntry1] : Doing well from gyn perspective.  Asymptomatic of vaginal atrophy.  Answered all questions.  Enc routine preventive care.\par

## 2023-01-13 NOTE — PHYSICAL EXAM
[Chaperone Declined] : Patient declined chaperone [Appropriately responsive] : appropriately responsive [Alert] : alert [No Acute Distress] : no acute distress [No Lymphadenopathy] : no lymphadenopathy [Soft] : soft [Non-tender] : non-tender [Non-distended] : non-distended [No HSM] : No HSM [No Lesions] : no lesions [No Mass] : no mass [Oriented x3] : oriented x3 [Examination Of The Breasts] : a normal appearance [No Masses] : no breast masses were palpable [Labia Majora] : normal [Labia Minora] : normal [Atrophy] : atrophy [Normal] : normal [Uterine Adnexae] : normal [FreeTextEntry9] : large hemorrhoid

## 2023-01-13 NOTE — HISTORY OF PRESENT ILLNESS
[TextBox_4] : 68yo P2 here for annual gyn.   without any bleeding.\par Sexually active without any dryness.\par Only c/o hemorrhoids; uses topical OTC and would not want surgery for them.\par \par She is in a monogamous relationship.  She has 2 children and 5 grandchildren.  Works at [employer]/ worked for  and [employer] for many years.

## 2023-01-14 LAB — HPV HIGH+LOW RISK DNA PNL CVX: NOT DETECTED

## 2023-01-19 LAB — CYTOLOGY CVX/VAG DOC THIN PREP: NORMAL

## 2023-04-13 ENCOUNTER — APPOINTMENT (OUTPATIENT)
Dept: OBGYN | Facility: CLINIC | Age: 68
End: 2023-04-13

## 2024-01-19 ENCOUNTER — APPOINTMENT (OUTPATIENT)
Dept: OBGYN | Facility: CLINIC | Age: 69
End: 2024-01-19
Payer: COMMERCIAL

## 2024-01-19 VITALS
HEIGHT: 61 IN | DIASTOLIC BLOOD PRESSURE: 80 MMHG | SYSTOLIC BLOOD PRESSURE: 120 MMHG | WEIGHT: 144 LBS | BODY MASS INDEX: 27.19 KG/M2

## 2024-01-19 DIAGNOSIS — Z01.419 ENCOUNTER FOR GYNECOLOGICAL EXAMINATION (GENERAL) (ROUTINE) W/OUT ABNORMAL FINDINGS: ICD-10-CM

## 2024-01-19 DIAGNOSIS — Z00.00 ENCOUNTER FOR GENERAL ADULT MEDICAL EXAMINATION W/OUT ABNORMAL FINDINGS: ICD-10-CM

## 2024-01-19 PROCEDURE — 99397 PER PM REEVAL EST PAT 65+ YR: CPT

## 2024-01-19 RX ORDER — HYDROCORTISONE ACETATE 25 MG/1
25 SUPPOSITORY RECTAL TWICE DAILY
Qty: 14 | Refills: 4 | Status: ACTIVE | COMMUNITY
Start: 2023-01-13 | End: 1900-01-01

## 2024-01-21 NOTE — HISTORY OF PRESENT ILLNESS
[TextBox_4] : 69yo P2 here for annual gyn.   without any bleeding. She is no longer sexually active ; no desire but not an issue for her relationship. Only c/o hemorrhoids; uses anucort that I sent last year with relief.  She would not want surgery for them.  She is in a monogamous relationship; they live together.  She has 2 children and 5 grandchildren.  Now works at [email or web address]; Worked at [employer]/ worked for  and [employer] for many years.

## 2024-01-22 LAB — HPV HIGH+LOW RISK DNA PNL CVX: NOT DETECTED

## 2024-01-24 LAB — CYTOLOGY CVX/VAG DOC THIN PREP: NORMAL

## 2024-09-02 ENCOUNTER — NON-APPOINTMENT (OUTPATIENT)
Age: 69
End: 2024-09-02

## 2025-01-21 ENCOUNTER — APPOINTMENT (OUTPATIENT)
Dept: OBGYN | Facility: CLINIC | Age: 70
End: 2025-01-21
Payer: COMMERCIAL

## 2025-01-21 VITALS
BODY MASS INDEX: 28.13 KG/M2 | SYSTOLIC BLOOD PRESSURE: 140 MMHG | HEIGHT: 61 IN | DIASTOLIC BLOOD PRESSURE: 78 MMHG | WEIGHT: 149 LBS

## 2025-01-21 DIAGNOSIS — Z01.419 ENCOUNTER FOR GYNECOLOGICAL EXAMINATION (GENERAL) (ROUTINE) W/OUT ABNORMAL FINDINGS: ICD-10-CM

## 2025-01-21 PROCEDURE — 99459 PELVIC EXAMINATION: CPT

## 2025-01-21 PROCEDURE — 99397 PER PM REEVAL EST PAT 65+ YR: CPT

## 2025-06-19 NOTE — REVIEW OF SYSTEMS
Health Maintenance       COVID-19 Vaccine (3 - 2024-25 season)  Overdue since 9/1/2024    Annual Physical (ages 3 - 21) (Yearly)  Never done    Depression Screening (Yearly)  Never done    Varicella Vaccine (1 of 2 - 13+ 2-dose series)  Never done    HPV Vaccine (1 - Male 3-dose series)  Never done    Meningococcal Serogroup B Vaccine (1 of 2 - Standard)  Never done    Hepatitis B Vaccine (1 of 3 - 19+ 3-dose series)  Never done           Following review of the above:  Patient wishes to discuss with clinician: COVID-19, HPV, and Meningococcal Serogroup B    Note: Refer to final orders and clinician documentation.         [Negative] : Heme/Lymph